# Patient Record
Sex: MALE | Race: WHITE | URBAN - NONMETROPOLITAN AREA
[De-identification: names, ages, dates, MRNs, and addresses within clinical notes are randomized per-mention and may not be internally consistent; named-entity substitution may affect disease eponyms.]

---

## 2017-02-03 ENCOUNTER — HISTORY (OUTPATIENT)
Dept: FAMILY MEDICINE | Facility: OTHER | Age: 52
End: 2017-02-03

## 2017-02-03 ENCOUNTER — OFFICE VISIT - GICH (OUTPATIENT)
Dept: FAMILY MEDICINE | Facility: OTHER | Age: 52
End: 2017-02-03

## 2017-02-03 DIAGNOSIS — M25.512 PAIN IN LEFT SHOULDER: ICD-10-CM

## 2017-02-03 DIAGNOSIS — G89.29 OTHER CHRONIC PAIN: ICD-10-CM

## 2017-03-16 ENCOUNTER — HISTORY (OUTPATIENT)
Dept: FAMILY MEDICINE | Facility: OTHER | Age: 52
End: 2017-03-16

## 2017-03-16 ENCOUNTER — OFFICE VISIT - GICH (OUTPATIENT)
Dept: FAMILY MEDICINE | Facility: OTHER | Age: 52
End: 2017-03-16

## 2017-03-16 DIAGNOSIS — M25.512 PAIN IN LEFT SHOULDER: ICD-10-CM

## 2017-03-16 DIAGNOSIS — G89.29 OTHER CHRONIC PAIN: ICD-10-CM

## 2017-03-30 ENCOUNTER — AMBULATORY - GICH (OUTPATIENT)
Dept: RADIOLOGY | Facility: OTHER | Age: 52
End: 2017-03-30

## 2017-03-30 DIAGNOSIS — M75.42 IMPINGEMENT SYNDROME OF LEFT SHOULDER: ICD-10-CM

## 2017-04-07 ENCOUNTER — HOSPITAL ENCOUNTER (OUTPATIENT)
Dept: RADIOLOGY | Facility: OTHER | Age: 52
End: 2017-04-07

## 2017-04-07 DIAGNOSIS — M75.42 IMPINGEMENT SYNDROME OF LEFT SHOULDER: ICD-10-CM

## 2017-04-17 ENCOUNTER — HISTORY (OUTPATIENT)
Dept: EMERGENCY MEDICINE | Facility: OTHER | Age: 52
End: 2017-04-17

## 2017-04-24 ENCOUNTER — OFFICE VISIT - GICH (OUTPATIENT)
Dept: INTERNAL MEDICINE | Facility: OTHER | Age: 52
End: 2017-04-24

## 2017-04-24 ENCOUNTER — AMBULATORY - GICH (OUTPATIENT)
Dept: SCHEDULING | Facility: OTHER | Age: 52
End: 2017-04-24

## 2017-04-24 ENCOUNTER — HISTORY (OUTPATIENT)
Dept: INTERNAL MEDICINE | Facility: OTHER | Age: 52
End: 2017-04-24

## 2017-04-24 DIAGNOSIS — J06.9 ACUTE UPPER RESPIRATORY INFECTION: ICD-10-CM

## 2017-04-24 DIAGNOSIS — B97.89 OTHER VIRAL AGENTS AS THE CAUSE OF DISEASES CLASSIFIED ELSEWHERE: ICD-10-CM

## 2017-04-24 DIAGNOSIS — R06.2 WHEEZING: ICD-10-CM

## 2017-04-24 DIAGNOSIS — Z87.891 PERSONAL HISTORY OF NICOTINE DEPENDENCE: ICD-10-CM

## 2017-04-24 DIAGNOSIS — R19.5 OTHER FECAL ABNORMALITIES: ICD-10-CM

## 2017-04-24 ASSESSMENT — PATIENT HEALTH QUESTIONNAIRE - PHQ9: SUM OF ALL RESPONSES TO PHQ QUESTIONS 1-9: 0

## 2017-04-26 ENCOUNTER — OFFICE VISIT - GICH (OUTPATIENT)
Dept: INTERNAL MEDICINE | Facility: OTHER | Age: 52
End: 2017-04-26

## 2017-04-26 ENCOUNTER — HOSPITAL ENCOUNTER (OUTPATIENT)
Dept: RADIOLOGY | Facility: OTHER | Age: 52
End: 2017-04-26
Attending: INTERNAL MEDICINE

## 2017-04-26 ENCOUNTER — HISTORY (OUTPATIENT)
Dept: INTERNAL MEDICINE | Facility: OTHER | Age: 52
End: 2017-04-26

## 2017-04-26 DIAGNOSIS — R05.9 COUGH: ICD-10-CM

## 2017-04-26 DIAGNOSIS — R06.02 SHORTNESS OF BREATH: ICD-10-CM

## 2017-04-26 DIAGNOSIS — R74.01 NONSPECIFIC ELEVATION OF LEVELS OF TRANSAMINASE AND LACTIC ACID DEHYDROGENASE (LDH): ICD-10-CM
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
## 2017-04-26 DIAGNOSIS — E87.6 HYPOKALEMIA: ICD-10-CM

## 2017-04-26 DIAGNOSIS — S30.860A INSECT BITE (NONVENOMOUS) OF LOWER BACK AND PELVIS, INITIAL ENCOUNTER: ICD-10-CM

## 2017-04-26 DIAGNOSIS — R74.02 NONSPECIFIC ELEVATION OF LEVELS OF TRANSAMINASE AND LACTIC ACID DEHYDROGENASE (LDH): ICD-10-CM

## 2017-04-26 LAB
A/G RATIO - HISTORICAL: 1.4 (ref 1–2)
ABSOLUTE BASOPHILS - HISTORICAL: 0 THOU/CU MM
ABSOLUTE EOSINOPHILS - HISTORICAL: 0 THOU/CU MM
ABSOLUTE LYMPHOCYTES - HISTORICAL: 2.3 THOU/CU MM (ref 0.9–2.9)
ABSOLUTE MONOCYTES - HISTORICAL: 0.4 THOU/CU MM
ABSOLUTE NEUTROPHILS - HISTORICAL: 2.3 THOU/CU MM (ref 1.7–7)
ALBUMIN SERPL-MCNC: 4.2 G/DL (ref 3.5–5.7)
ALP SERPL-CCNC: 110 IU/L (ref 34–104)
ALT (SGPT) - HISTORICAL: 64 IU/L (ref 7–52)
ANION GAP - HISTORICAL: 15 (ref 5–18)
AST SERPL-CCNC: 40 IU/L (ref 13–39)
BASOPHILS # BLD AUTO: 0.9 %
BILIRUB SERPL-MCNC: 0.7 MG/DL (ref 0.3–1)
BUN SERPL-MCNC: 23 MG/DL (ref 7–25)
BUN/CREAT RATIO - HISTORICAL: 21
CALCIUM SERPL-MCNC: 9.1 MG/DL (ref 8.6–10.3)
CHLORIDE SERPLBLD-SCNC: 99 MMOL/L (ref 98–107)
CO2 SERPL-SCNC: 22 MMOL/L (ref 21–31)
CREAT SERPL-MCNC: 1.12 MG/DL (ref 0.7–1.3)
EOSINOPHIL NFR BLD AUTO: 0.1 %
ERYTHROCYTE [DISTWIDTH] IN BLOOD BY AUTOMATED COUNT: 12.9 % (ref 11.5–15.5)
GFR IF NOT AFRICAN AMERICAN - HISTORICAL: >60 ML/MIN/1.73M2
GLOBULIN - HISTORICAL: 3.1 G/DL (ref 2–3.7)
GLUCOSE SERPL-MCNC: 107 MG/DL (ref 70–105)
HCT VFR BLD AUTO: 51.7 % (ref 37–53)
HEMOGLOBIN: 17 G/DL (ref 13.5–17.5)
LYMPHOCYTES NFR BLD AUTO: 45.2 % (ref 20–44)
MCH RBC QN AUTO: 28.2 PG (ref 26–34)
MCHC RBC AUTO-ENTMCNC: 32.8 G/DL (ref 32–36)
MCV RBC AUTO: 86 FL (ref 80–100)
MONOCYTES NFR BLD AUTO: 8.5 %
NEUTROPHILS NFR BLD AUTO: 45.3 % (ref 42–72)
PLATELET # BLD AUTO: 211 THOU/CU MM (ref 140–440)
PMV BLD: 10.4 FL (ref 6.5–11)
POTASSIUM SERPL-SCNC: 3 MMOL/L (ref 3.5–5.1)
PROT SERPL-MCNC: 7.3 G/DL (ref 6.4–8.9)
RED BLOOD COUNT - HISTORICAL: 6.01 MIL/CU MM (ref 4.3–5.9)
SODIUM SERPL-SCNC: 136 MMOL/L (ref 133–143)
WHITE BLOOD COUNT - HISTORICAL: 5.2 THOU/CU MM (ref 4.5–11)

## 2017-04-26 ASSESSMENT — PATIENT HEALTH QUESTIONNAIRE - PHQ9: SUM OF ALL RESPONSES TO PHQ QUESTIONS 1-9: 0

## 2017-04-27 LAB — LYME SCREEN W/REFLEX WEST BLOT - HISTORICAL: NEGATIVE

## 2017-05-25 ENCOUNTER — AMBULATORY - GICH (OUTPATIENT)
Dept: SCHEDULING | Facility: OTHER | Age: 52
End: 2017-05-25

## 2017-05-26 ENCOUNTER — COMMUNICATION - GICH (OUTPATIENT)
Dept: INTERNAL MEDICINE | Facility: OTHER | Age: 52
End: 2017-05-26

## 2017-05-31 ENCOUNTER — COMMUNICATION - GICH (OUTPATIENT)
Dept: FAMILY MEDICINE | Facility: OTHER | Age: 52
End: 2017-05-31

## 2017-05-31 ENCOUNTER — COMMUNICATION - GICH (OUTPATIENT)
Dept: INTERNAL MEDICINE | Facility: OTHER | Age: 52
End: 2017-05-31

## 2017-06-05 ENCOUNTER — AMBULATORY - GICH (OUTPATIENT)
Dept: FAMILY MEDICINE | Facility: OTHER | Age: 52
End: 2017-06-05

## 2017-06-06 ENCOUNTER — COMMUNICATION - GICH (OUTPATIENT)
Dept: INTERNAL MEDICINE | Facility: OTHER | Age: 52
End: 2017-06-06

## 2017-06-07 ENCOUNTER — AMBULATORY - GICH (OUTPATIENT)
Dept: SCHEDULING | Facility: OTHER | Age: 52
End: 2017-06-07

## 2017-06-14 ENCOUNTER — AMBULATORY - GICH (OUTPATIENT)
Dept: FAMILY MEDICINE | Facility: OTHER | Age: 52
End: 2017-06-14

## 2017-06-14 ENCOUNTER — HISTORY (OUTPATIENT)
Dept: FAMILY MEDICINE | Facility: OTHER | Age: 52
End: 2017-06-14

## 2017-06-14 DIAGNOSIS — I10 ESSENTIAL (PRIMARY) HYPERTENSION: ICD-10-CM

## 2017-06-14 DIAGNOSIS — G89.29 OTHER CHRONIC PAIN: ICD-10-CM

## 2017-06-14 DIAGNOSIS — M12.812 OTHER SPECIFIC ARTHROPATHIES, NOT ELSEWHERE CLASSIFIED, LEFT SHOULDER: ICD-10-CM

## 2017-06-14 DIAGNOSIS — E78.00 PURE HYPERCHOLESTEROLEMIA: ICD-10-CM

## 2017-06-14 DIAGNOSIS — M19.012 PRIMARY OSTEOARTHRITIS OF LEFT SHOULDER: ICD-10-CM

## 2017-06-14 DIAGNOSIS — Z01.818 ENCOUNTER FOR OTHER PREPROCEDURAL EXAMINATION: ICD-10-CM

## 2017-06-14 DIAGNOSIS — M25.512 PAIN IN LEFT SHOULDER: ICD-10-CM

## 2017-06-14 LAB
ANION GAP - HISTORICAL: 10 (ref 5–18)
BUN SERPL-MCNC: 22 MG/DL (ref 7–25)
BUN/CREAT RATIO - HISTORICAL: 19
CALCIUM SERPL-MCNC: 9.6 MG/DL (ref 8.6–10.3)
CHLORIDE SERPLBLD-SCNC: 104 MMOL/L (ref 98–107)
CO2 SERPL-SCNC: 24 MMOL/L (ref 21–31)
CREAT SERPL-MCNC: 1.18 MG/DL (ref 0.7–1.3)
GFR IF NOT AFRICAN AMERICAN - HISTORICAL: >60 ML/MIN/1.73M2
GLUCOSE SERPL-MCNC: 99 MG/DL (ref 70–105)
POTASSIUM SERPL-SCNC: 3.7 MMOL/L (ref 3.5–5.1)
SODIUM SERPL-SCNC: 138 MMOL/L (ref 133–143)

## 2017-06-14 ASSESSMENT — ANXIETY QUESTIONNAIRES
2. NOT BEING ABLE TO STOP OR CONTROL WORRYING: NOT AT ALL
GAD7 TOTAL SCORE: 0
7. FEELING AFRAID AS IF SOMETHING AWFUL MIGHT HAPPEN: NOT AT ALL
3. WORRYING TOO MUCH ABOUT DIFFERENT THINGS: NOT AT ALL
4. TROUBLE RELAXING: NOT AT ALL
6. BECOMING EASILY ANNOYED OR IRRITABLE: NOT AT ALL
5. BEING SO RESTLESS THAT IT IS HARD TO SIT STILL: NOT AT ALL
1. FEELING NERVOUS, ANXIOUS, OR ON EDGE: NOT AT ALL

## 2017-06-14 ASSESSMENT — PATIENT HEALTH QUESTIONNAIRE - PHQ9: SUM OF ALL RESPONSES TO PHQ QUESTIONS 1-9: 0

## 2017-08-14 ENCOUNTER — COMMUNICATION - GICH (OUTPATIENT)
Dept: FAMILY MEDICINE | Facility: OTHER | Age: 52
End: 2017-08-14

## 2017-08-14 DIAGNOSIS — I10 ESSENTIAL (PRIMARY) HYPERTENSION: ICD-10-CM

## 2017-12-20 ENCOUNTER — COMMUNICATION - GICH (OUTPATIENT)
Dept: FAMILY MEDICINE | Facility: OTHER | Age: 52
End: 2017-12-20

## 2017-12-20 DIAGNOSIS — B97.89 OTHER VIRAL AGENTS AS THE CAUSE OF DISEASES CLASSIFIED ELSEWHERE: ICD-10-CM

## 2017-12-20 DIAGNOSIS — J06.9 ACUTE UPPER RESPIRATORY INFECTION: ICD-10-CM

## 2017-12-28 NOTE — TELEPHONE ENCOUNTER
Patient Information     Patient Name MRN Sex Alcides Lomas [id number] Male 1965      Telephone Encounter by Charo Andrews at 2017  3:02 PM     Author:  Charo Andrews Service:  (none) Author Type:  (none)     Filed:  2017  3:07 PM Encounter Date:  2017 Status:  Signed     :  Charo Andrews            Spoke with patient regarding his leave of absence form recently faxed. It is still incorrect. He needs the following changes done to it. Line 1 first treatment date should be 17. Line 5 needs an end date of 17. The time away from work 1 week needs to be scratched off. Both lines 1 & 5 need to be initialed and dated. Charo Andrews LPN......................2017 3:06 PM

## 2017-12-28 NOTE — TELEPHONE ENCOUNTER
Patient Information     Patient Name MRN Alcides Dominguez [id number] Male 1965      Telephone Encounter by Marcelina Vasquez at 2017  4:20 PM     Author:  Marcelina Vasquez Service:  (none) Author Type:  (none)     Filed:  2017  4:22 PM Encounter Date:  2017 Status:  Signed     :  Marcelina Vasquez            Information corrected and re-faxed at this time.      Marcelina Vasquez LPN        2017 4:22 PM

## 2017-12-28 NOTE — TELEPHONE ENCOUNTER
Patient Information     Patient Name MRN Sex Alcides Lomas [id number] Male 1965      Telephone Encounter by Nickolas Mcneil MD at 2017  4:16 PM     Author:  Nickolas Mcneil MD Service:  (none) Author Type:  Physician     Filed:  2017  4:16 PM Encounter Date:  2017 Status:  Signed     :  Nickolas Mcneil MD (Physician)            Noted.   Nickolas Mcneil MD

## 2017-12-28 NOTE — TELEPHONE ENCOUNTER
Patient Information     Patient Name MRN Sex Alcides Lomas [id number] Male 1965      Telephone Encounter by Gypsy Smith RN at 2017  8:13 AM     Author:  Gypsy Smith RN Service:  (none) Author Type:  NURS- Registered Nurse     Filed:  2017  8:14 AM Encounter Date:  2017 Status:  Signed     :  Gypsy Smith RN (NURS- Registered Nurse)            Diuretic Combinations    Office visit in the past 12 months or per provider note.    Last visit with CONOR ODELL was on: 2017 in [facility]  Next visit with CONOR ODELL is on: No future appointment listed with this provider  Next visit with Family Practice is on: No future appointment listed in this department    Lab test requirements:  Creatinine and Potassium annually, if ordering lab, order BMP.  CREATININE (mg/dL)    Date Value   2017 1.18     POTASSIUM (mmol/L)    Date Value   2017 3.7       Max refill for 12 months from last office visit or per provider note.  Prescription refilled per RN Medication Refill Policy.................... GYPSY SMITH RN ....................  2017   8:13 AM
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
## 2017-12-30 NOTE — NURSING NOTE
Patient Information     Patient Name MRN Sex Alcides Lomas [id number] Male 1965      Nursing Note by Tanisha Montejo at 2017  1:30 PM     Author:  Tanisha Montejo Service:  (none) Author Type:  (none)     Filed:  2017  1:38 PM Encounter Date:  2017 Status:  Signed     :  Tanisha Montejo            Patient here for preop will be seeing  on 2017 at [place] for left shoulder scope and debridement. Tdap 2016. Tanisha Montejo LPN .......................2017  1:35 PM

## 2018-01-03 NOTE — NURSING NOTE
Patient Information     Patient Name MRN Alcides Dominguez [id number] Male 1965      Nursing Note by Tianna Jimenez at 3/16/2017  1:30 PM     Author:  Tianna Jimenez Service:  (none) Author Type:  (none)     Filed:  3/16/2017  1:47 PM Encounter Date:  3/16/2017 Status:  Signed     :  Tianna Jimenez            Patient presents to the clinic today for left should pain.    Tianna Jimenez ....................  3/16/2017   1:34 PM

## 2018-01-03 NOTE — PROGRESS NOTES
Patient Information     Patient Name MRN Sex Alcides Lomas [id number] Male 1965      Progress Notes by Asad Hermosillo MD at 3/16/2017  1:30 PM     Author:  Asad Hermosillo MD Service:  (none) Author Type:  Physician     Filed:  3/17/2017  9:23 AM Encounter Date:  3/16/2017 Status:  Signed     :  Asad Hermosillo MD (Physician)            SUBJECTIVE:    Alcides Ordonez is a 51 y.o. male who presents for left shoulder pain    HPI Comments: Patient arrives here for left shoulder pain. He's been doing Lodine plus exercises at home without any improvement. This is been going on for 2 months. Again he denies any obvious injury. Usually laying on it will make it worse.      Allergies     Allergen  Reactions     Codeine Anxiety     Lipitor [Atorvastatin] Myalgia     Pcn [Penicillins] Edema   ,   Family History      Problem  Relation Age of Onset     Heart Disease Paternal Grandfather    ,   Current Outpatient Prescriptions on File Prior to Visit       Medication  Sig Dispense Refill     budesonide-formoterol (SYMBICORT) 160-4.5 mcg/actuation (160-4.5 mcg each actuation) inhaler Inhale 2 Puffs by mouth 2 times daily. 1 Inhaler 0     etodolac (LODINE) 500 mg tablet Take 1 tablet by mouth 2 times daily with meals. 30 tablet 2     lisinopril-hydrochlorothiazide, 20-25 mg, (PRINZIDE, ZESTORETIC) 20-25 mg per tablet Take 1 tablet by mouth once daily. 90 tablet 3     multivitamin (MVI) tablet Take 1 tablet by mouth once daily.       omeprazole (PRILOSEC) 40 mg Delayed-Release capsule Take 1 capsule by mouth once daily. 90 capsule 3     simvastatin (ZOCOR) 40 mg tablet Take 1 tablet by mouth at bedtime. 90 tablet 2     triamcinolone (ARISTOCORT; KENALOG) 0.1 % cream Apply  topically to affected area(s) 3 times daily. 1 Tube 3     No current facility-administered medications on file prior to visit.    ,   Social History      Substance Use Topics        Smoking status:  Former Smoker     Quit date:  "7/16/2008     Smokeless tobacco:  Never Used     Alcohol use  No    and   Social History      Substance Use Topics        Smoking status:  Former Smoker     Quit date: 7/16/2008     Smokeless tobacco:  Never Used     Alcohol use  No       REVIEW OF SYSTEMS:  ROS    OBJECTIVE:  /80  Pulse 84  Ht 1.842 m (6' 0.5\")  Wt 107.5 kg (237 lb)  BMI 31.7 kg/m2    EXAM:   Physical Exam   Constitutional: He is well-developed, well-nourished, and in no distress.   Musculoskeletal: Normal range of motion. He exhibits tenderness (with abduction past 90.).   Neurological: He is alert.   Psychiatric: Affect normal.       ASSESSMENT/PLAN:    ICD-10-CM    1. Chronic left shoulder pain M25.512 triamcinolone acetonide 40 mg injection (KENALOG)     G89.29 NJ DRAIN/INJ INTERMEDIATE JOINT/BURSA WO US GUIDE        Plan:  Informed consent obtained. Patient's identity confirmed. Discussed complications to injection including infection. Patient wishes to pursue. Area was prepped with Hibiclens ×2. Using approach underneath the acromion 40 mg of Kenalog and half a cc of lidocaine was injected. Patient tolerated well. Patient will follow-up if he does not get good improvement. I did discuss other options including orthopedic referral but patient wishes to pursue injections first.          "

## 2018-01-03 NOTE — NURSING NOTE
Patient Information     Patient Name MRN Sex Alcides Lomas [id number] Male 1965      Nursing Note by Tanisha Montejo at 2/3/2017 10:00 AM     Author:  Tanisha Montejo Service:  (none) Author Type:  (none)     Filed:  2/3/2017 10:07 AM Encounter Date:  2/3/2017 Status:  Signed     :  Tanisha Montejo            Patient here for left shoulder pain for the past 2 months which is getting, becoming more frequent and more intense in pain.  Pain scale now is a 2/10, feels like a constant ache. Tanisha Montejo LPN .......................2/3/2017  10:03 AM

## 2018-01-03 NOTE — PATIENT INSTRUCTIONS
Patient Information     Patient Name MRN Sex Alcides Lomas [id number] Male 1965      Patient Instructions by Asad Hermosillo MD at 2/3/2017 10:13 AM     Author:  Asad Hermosillo MD Service:  (none) Author Type:  Physician     Filed:  2/3/2017 10:13 AM Encounter Date:  2/3/2017 Status:  Signed     :  Asad Hermosillo MD (Physician)            Index Czech     Adult Advisor 2016.2 published by [facility].  Last reviewed: 2014
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
## 2018-01-04 NOTE — NURSING NOTE
Patient Information     Patient Name MRN Sex Alcides Lomas [id number] Male 1965      Nursing Note by Marcelina Vasquez at 2017  8:10 AM     Author:  Marcelina Vasquez Service:  (none) Author Type:  (none)     Filed:  2017  8:11 AM Encounter Date:  2017 Status:  Signed     :  Marcelina Vasquez            Patient presents to the clinic for cough, decrease in appetite and fatigue noted over the past week.  Fever only occurred this past weekend.    Marcelina Vasquez LPN        2017 8:11 AM

## 2018-01-04 NOTE — NURSING NOTE
Patient Information     Patient Name MRN Sex Alcides Lomas [id number] Male 1965      Nursing Note by Marcelina Vasquez at 2017 12:30 PM     Author:  Marcelina Vasquez Service:  (none) Author Type:  (none)     Filed:  2017 12:30 PM Encounter Date:  2017 Status:  Signed     :  Marcelina Vasquez            Patient presents to the clinic for fatigue, non-productive cough and decrease in appetite noted over the past several days, diarrhea started this am.  Patient indicates 99.5 is the highest his temperature reading this past week.    Marcelina Vasquez LPN        2017 12:24 PM

## 2018-01-04 NOTE — PROGRESS NOTES
Patient Information     Patient Name MRN Sex Alcides Lomas [id number] Male 1965      Progress Notes by Erika Villa at 2017  2:59 PM     Author:  Erika Villa Service:  (none) Author Type:  (none)     Filed:  2017  2:59 PM Date of Service:  2017  2:59 PM Status:  Signed     :  Erika Villa            [place] Protocol    A. Pre-procedure verification complete yes  1-relevant information / documentation available, reviewed and properly matched to the patient; 2-consent accurate and complete, 3-equipment and supplies available    B. Site marking complete Yes  Site marked if not in continuous attendance with patient    C. TIME OUT completed yes  Time Out was conducted just prior to starting procedure to verify the eight required elements: 1-patient identity, 2-consent accurate and complete, 3-position, 4-correct side/site marked (if applicable), 5-procedure, 6-relevant images / results properly labeled and displayed (if applicable), 7-antibiotics / irrigation fluids (if applicable), 8-safety precautions.

## 2018-01-04 NOTE — PROGRESS NOTES
Patient Information     Patient Name MRN Sex Alcides Lomas [id number] Male 1965      Progress Notes by Erika Villa at 2017  2:59 PM     Author:  Erika Villa Service:  (none) Author Type:  (none)     Filed:  2017  2:59 PM Date of Service:  2017  2:59 PM Status:  Signed     :  Erika Villa            Falls Risk Criteria:    Age 65 and older or under age 4        Sensory deficits    Poor vision    Use of ambulatory aides    Impaired judgment    Unable to walk independently    Meets High Risk criteria for falls:  no

## 2018-01-04 NOTE — PROGRESS NOTES
Patient Information     Patient Name MRAlcides Hernandez [id number] Male 1965      Progress Notes by Nickolas Mcneil MD at 2017 12:30 PM     Author:  Nickolas Mcneil MD Service:  (none) Author Type:  Physician     Filed:  2017  2:25 PM Encounter Date:  2017 Status:  Signed     :  Nickolas Mcneil MD (Physician)            Nursing Notes:   Pedro, Marcelina HILL  2017 12:30 PM  Signed  Patient presents to the clinic for fatigue, non-productive cough and decrease in appetite noted over the past several days, diarrhea started this am.  Patient indicates 99.5 is the highest his temperature reading this past week.    Marcelina Vasquez LPN        2017 12:24 PM      Pedro Marcelina SERGIO  2017 12:40 PM  Signed  Duo Neb 1 vial ordered by Nickolas Mcneil MD.  Medication administered by Nickolas Mcneil MD.   Lot # [id number]  Exp. 2018  NDC 3698-7178-25  Patient tolerated well.  Marcelina Vasquez LPN..................2017  12:39 PM    Alcides Ordonez presents to clinic today for:   Chief Complaint    Patient presents with      General Illness/Other     HPI: Mr. Ordonez is a 51 y.o. male who presents today for evaluation of above.     (J06.9,  B97.89) Viral URI with cough  (primary encounter diagnosis)  (R19.5) Watery stools  (Z87.891) History of tobacco abuse  (R06.2) Wheezing     Patient states that beginning on Friday, just a few days ago, he was having a lot of coughing and he was quite weak over the weekend.  Was not really taking in a whole lot of food by mouth.  Since we didn't taste good.  This morning he started to have some watery diarrhea.  He had 4 episodes of watery stool so far today.  Has not had really any recent oral antibiotic therapy.  Rarely having any phlegm.  He's had some increased wheezing which is somewhat new for him.  Had a mild headache yesterday for a couple hours.  States that he slept most of the day yesterday.  Denied any significant myalgias or  body aches.  No high fevers.  No longer having headaches.    Patient has history of tobacco use he quit back 9 years ago.  Wheezing is new.    Advised to use over-the-counter Imodium if needed for loose stools.  No bloody or tarry stools.  No dysuria.  No bruising.  No vision or hearing changes.    Trial of DuoNeb nebulizer treatment in clinic today for the wheezing.    Mr. Ordonez's Body mass index is 31.24 kg/(m^2). This is out of the normal range for a 51 y.o. Normal range for ages 18-64 is between 18.5 and 24.9; normal range for ages 65+ is 23-30. To lose weight we reviewed risks and benefits of appropriate options such as diet, exercise, and medications. Patient's strategy will be  none; patient is not ready to act   BP Readings from Last 1 Encounters:04/24/17 : 108/76  Mr. Flannery blood pressure is within the normal range for adults. Per JNC-8 guidelines normal adult blood pressure is < 120/80, pre-hypertensive is between 120/80 and 139/89, and hypertension is 140/90 or greater.    PARVEZ:  No flowsheet data found.    PHQ9:  PHQ Depression Screening 3/16/2017 4/24/2017   Date of PHQ exam (doc flow) 3/16/2017 4/24/2017   1. Lack of interest/pleasure 0 - Not at all 0 - Not at all   2. Feeling down/depressed 0 - Not at all 0 - Not at all   PHQ-2 TOTAL SCORE 0 0   3. Trouble sleeping - 0 - Not at all   4. Decreased energy - 0 - Not at all   5. Appetite change - 0 - Not at all   6. Feelings of failure - 0 - Not at all   7. Trouble concentrating - 0 - Not at all   8. Activity level - 0 - Not at all   9. Hurting yourself - 0 - Not at all   PHQ-9 TOTAL SCORE - 0   PHQ-9 Severity Level - none   Functional Impairment - not difficult at all        I have personally reviewed the past medical history, past surgical history, medications, allergies, family and social history as listed below, on 4/24/2017.    Patient Active Problem List      Diagnosis Date Noted     Chronic left shoulder pain 02/03/2017      Hypertriglyceridemia 2016     Seborrheic dermatitis 2015     Decreased sex drive 2014     Hypertension 02/10/2014     Hyperlipidemia 02/10/2014     GERD (gastroesophageal reflux disease) 02/10/2014     No past medical history on file.  Past Surgical History:      Procedure  Laterality Date     COLONOSCOPY DIAGNOSTIC  14     F/U        KNEE ARTHROSCOPY      lt        OR REMOVAL TONSILS/ADENOIDS<12 YRS       SPL[facility]ECTOMY      from [facility]       Current Outpatient Prescriptions       Medication  Sig Dispense Refill     albuterol-ipratropium (DUONEB) (2.5-0.5 mg) in 3 mL NEBULIZATION solution Inhale 3 mL via a nebulizer one time for 1 dose. 3 mL 0     Dextromethorphan-guaFENesin (MUCINEX DM)  mg tablet Take 1 tablet by mouth 2 times daily if needed for Cough. - OTC / Generic Okay 60 tablet 1     etodolac (LODINE) 500 mg tablet Take 1 tablet by mouth 2 times daily with meals. 30 tablet 2     ipratropium-albuterol (COMBIVENT RESPIMAT) ( mcg each actuation) mist inhaler Inhale 1 Puff by mouth 4 times daily. - Scheduled Use 1 Inhaler 11     lisinopril-hydrochlorothiazide, 20-25 mg, (PRINZIDE, ZESTORETIC) 20-25 mg per tablet Take 1 tablet by mouth once daily. 90 tablet 3     methylPREDNISolone (MEDROL) 8 mg tablet Take 1 tablet by mouth once daily with a meal. - Take for 3-7 days for wheezing, restart if needed 14 tablet 0     multivitamin (MVI) tablet Take 1 tablet by mouth once daily.       omeprazole (PRILOSEC) 40 mg Delayed-Release capsule Take 1 capsule by mouth once daily. 90 capsule 3     simvastatin (ZOCOR) 40 mg tablet Take 1 tablet by mouth at bedtime. 90 tablet 2     Allergies     Allergen  Reactions     Codeine Anxiety     Lipitor [Atorvastatin] Myalgia     Pcn [Penicillins] Edema     Family History      Problem  Relation Age of Onset     Heart Disease Paternal Grandfather      Family Status     Relation  Status     Mother      Father Alive     Paternal Grandfather       Social History     Social History        Marital status:  Single     Spouse name: N/A     Number of children:  N/A     Years of education:  N/A     Social History Main Topics        Smoking status:  Former Smoker     Quit date: 7/16/2008     Smokeless tobacco:  Never Used     Alcohol use  No     Drug use:  No     Sexual activity:  Not on file     Other Topics  Concern     Not on file      Social History Narrative     Pertinent ROS was performed and was negative as noted in HPI above.     EXAM:   Vitals:     04/24/17 1224   BP: 108/76   Pulse: 84   Temp: 97  F (36.1  C)   TempSrc: Tympanic   SpO2: 96%   Weight: 104.5 kg (230 lb 6 oz)   Height: 1.829 m (6')     BP Readings from Last 3 Encounters:    04/24/17 108/76   04/17/17 135/89   03/16/17 114/80     Wt Readings from Last 3 Encounters:    04/24/17 104.5 kg (230 lb 6 oz)   04/17/17 104.3 kg (230 lb)   03/16/17 107.5 kg (237 lb)     Estimated body mass index is 31.24 kg/(m^2) as calculated from the following:    Height as of this encounter: 1.829 m (6').    Weight as of this encounter: 104.5 kg (230 lb 6 oz).     EXAM:  Constitutional: Pleasant, alert, appropriate appearance for age. No acute distress -- nonproductive cough.  ENT: Normocephalic, Atraumatic, Thyroid without nodules or tenderness   Left Ear: normal; external ear canal and TM clear, nontender.   Right Ear: normal; external ear canal and TM clear, nontender.   Nose/Mouth: Oral pharynx without erythema or exudates and Nose is patent bilaterally, no rhinorrhea   Eyes:  Extraocular muscles intact, Sclera non-icteric, Conjunctiva without erythema  Lymphatic Exam: Non-palpable nodes in neck, clavicular regions    Pulmonary: Nonproductive cough, mild scattered bilateral wheezing noted.  -- After DuoNeb nebulizer treatment performed, significant improvement in air movement, wheezing had resolved.    Cardiovascular Exam: regular rate and rhythm, no pedal edema  Gastrointestinal Exam: Obese, Soft,  non-tender, non-distended, positive bowel sounds  Integument: No abnormal rashes, sores, or ulcerations noted  Neurologic Exam: CN 2-12 grossly intact and patient able to mount exam table without difficulties   Musculoskeletal Exam: Moves upper and lower extremities symmetrically, No focal weakness  Gait and station appear grossly normal  Psychiatric Exam: Awake and Alert, Affect and mood appropriate  Speech is fluent, Thought process is normal    INVESTIGATIONS:  Results for orders placed or performed in visit on 11/14/16      RAPID STREP WITH REFLEX CULTURE      Result  Value Ref Range    STREP A ANTIGEN           Negative Negative       ASSESSMENT AND PLAN:  Alcides was seen today for general illness/other.    Diagnoses and all orders for this visit:    Viral URI with cough  -     albuterol-ipratropium (DUONEB) (2.5-0.5 mg) in 3 mL NEBULIZATION solution; Inhale 3 mL via a nebulizer one time for 1 dose.  -     CT INHALATION TREATMENT AIRWAY OBST  -     CT PULSE OXIMETRY SINGLE DETERMINATION  -     Dextromethorphan-guaFENesin (MUCINEX DM)  mg tablet; Take 1 tablet by mouth 2 times daily if needed for Cough. - OTC / Generic Okay    Watery stools    History of tobacco abuse  -     albuterol-ipratropium (DUONEB) (2.5-0.5 mg) in 3 mL NEBULIZATION solution; Inhale 3 mL via a nebulizer one time for 1 dose.  -     Dextromethorphan-guaFENesin (MUCINEX DM)  mg tablet; Take 1 tablet by mouth 2 times daily if needed for Cough. - OTC / Generic Okay  -     methylPREDNISolone (MEDROL) 8 mg tablet; Take 1 tablet by mouth once daily with a meal. - Take for 3-7 days for wheezing, restart if needed  -     ipratropium-albuterol (COMBIVENT RESPIMAT) ( mcg each actuation) mist inhaler; Inhale 1 Puff by mouth 4 times daily. - Scheduled Use    Wheezing  -     Dextromethorphan-guaFENesin (MUCINEX DM)  mg tablet; Take 1 tablet by mouth 2 times daily if needed for Cough. - OTC / Generic Okay  -     methylPREDNISolone  (MEDROL) 8 mg tablet; Take 1 tablet by mouth once daily with a meal. - Take for 3-7 days for wheezing, restart if needed  -     ipratropium-albuterol (COMBIVENT RESPIMAT) ( mcg each actuation) mist inhaler; Inhale 1 Puff by mouth 4 times daily. - Scheduled Use      lab results and schedule of future lab studies reviewed with patient, reviewed diet, exercise and weight control    -- Expected clinical course discussed   -- Medications and their side effects discussed    25 minutes spent in face-to-face interaction with patient (separate from separately billed procedures) with greater than 50% spent in counseling and care coordination of listed medical problems.      The 10-year ASCVD risk score ([place] FRAZIER Jr, et al., 2013) is: 5.2%    Values used to calculate the score:      Age: 51 years      Sex: Male      Is Non- : No      Diabetic: No      Tobacco smoker: No      Systolic Blood Pressure: 108 mmHg      Is BP treated: Yes      HDL Cholesterol: 26 mg/dL      Total Cholesterol: 193 mg/dL    Return if symptoms worsen or fail to improve.    Patient Instructions   DuoNeb nebulizer treatment with significant improvement in air movement and cough and wheezing in the clinic today.    Possible viral chest cold/bronchitis. -- History of tobacco use with wheezing noted.    Push oral hydration - prevent dehydration.  Urine should be clear or light yellow.    Start: Combivent Respimat inhaler.  1 puff up to 4 times daily for breathing and wheezing.    Consider -- Albuterol Inhaler - use as needed for wheezing and shortness of breath.   -- take 1 to 2 puff every 4 hours as needed.   -- Pre-treat prior to exposures or exercise that have been exacerbating your cough/breathing problems.   -- Try to use 15 to 30 minutes before exposures.    Mucinex DM - helps thin the phlegm and settle the cough, without causing drowsiness.   - Maximum strength (buy the generic)    Start Medrol, 8 mg prednisone tablet.   Take 1 pill by mouth daily with a meal.  Do this for anywhere from 3-7 days.  Repeat if needed for recurrent wheezing and breathing difficulties.    If significant changes occur where you start making lots of phlegm, has significant shortness of breath, fevers and chills and above treatments are not helping, recommended reevaluation.  May need chest x-ray possible antibiotics at that time.    Return as needed for follow-up with Dr. Mcneil.    Clinic : [phone]  Appointment line: [phone]      Nickolas Mcneil MD

## 2018-01-04 NOTE — PATIENT INSTRUCTIONS
Patient Information     Patient Name MRN Sex Alcides Lomas [id number] Male 1965      Patient Instructions by Nickolas Mcneil MD at 2017  8:10 AM     Author:  Nickolas Mcneil MD  Service:  (none) Author Type:  Physician     Filed:  2017  8:40 AM  Encounter Date:  2017 Status:  Addendum     :  Nickolas Mcneil MD (Physician)        Related Notes: Original Note by Nickolas Mcneil MD (Physician) filed at 2017  8:37 AM            Concerned about possible Lyme disease or Anaplasmosis.     Start doxycycline 100 mg tablet -- take twice daily (AM and PM) after meals.   Avoid dairy products within 1 to 2 hours (before or after) taking antibiotic -- as it will grab the antibiotic and not let it work.   -- you can eat whatever you want for lunch.       With cough, vomiting -- chest xray obtained.     Use tessalon pearles as directed for cough.       Push oral hydration - prevent dehydration.  Urine should be clear or light yellow.      2017 -- PROCEDURE:  XR CHEST 2 VIEWS PA AND LATERAL     HISTORY: Cough.     COMPARISON:  2016     FINDINGS:   The cardiac silhouette is normal in size. The pulmonary vasculature is normal.  The lungs are clear. No pleural effusion or pneumothorax.     IMPRESSION:  No acute cardiopulmonary disease.       Electronically Signed By: Tiana Quiñones M.D. on 2017 8:28 AM      - Rocephin shot in clinic today - antibiotic.     Consider Permethrin pants -- that helps with wood tick prevention.     Return as needed for follow-up with Dr. Mcneil.    Clinic : [phone]  Appointment line: [phone]

## 2018-01-04 NOTE — PATIENT INSTRUCTIONS
Patient Information     Patient Name MRN Sex Alcides Lomas [id number] Male 1965      Patient Instructions by Nickolas Mcneil MD at 2017 12:30 PM     Author:  Nickolas Mcneil MD  Service:  (none) Author Type:  Physician     Filed:  2017  1:01 PM  Encounter Date:  2017 Status:  Addendum     :  Nickolas Mcneil MD (Physician)        Related Notes: Original Note by Nickolas Mcneil MD (Physician) filed at 2017  1:00 PM            DuoNeb nebulizer treatment with significant improvement in air movement and cough and wheezing in the clinic today.    Possible viral chest cold/bronchitis. -- History of tobacco use with wheezing noted.    Push oral hydration - prevent dehydration.  Urine should be clear or light yellow.    Start: Combivent Respimat inhaler.  1 puff up to 4 times daily for breathing and wheezing.    Consider -- Albuterol Inhaler - use as needed for wheezing and shortness of breath.   -- take 1 to 2 puff every 4 hours as needed.   -- Pre-treat prior to exposures or exercise that have been exacerbating your cough/breathing problems.   -- Try to use 15 to 30 minutes before exposures.    Mucinex DM - helps thin the phlegm and settle the cough, without causing drowsiness.   - Maximum strength (buy the generic)    Start Medrol, 8 mg prednisone tablet.  Take 1 pill by mouth daily with a meal.  Do this for anywhere from 3-7 days.  Repeat if needed for recurrent wheezing and breathing difficulties.    If significant changes occur where you start making lots of phlegm, has significant shortness of breath, fevers and chills and above treatments are not helping, recommended reevaluation.  May need chest x-ray possible antibiotics at that time.    Return as needed for follow-up with Dr. Mcneil.    Clinic : [phone]  Appointment line: [phone]

## 2018-01-04 NOTE — NURSING NOTE
Patient Information     Patient Name MRN Sex Alcides Lomas [id number] Male 1965      Nursing Note by Marcelina Vasquez at 2017 12:30 PM     Author:  Marcelina Vasquez Service:  (none) Author Type:  (none)     Filed:  2017 12:40 PM Encounter Date:  2017 Status:  Signed     :  Marcelina Vasquez            [facility] 1 vial ordered by Nickolas Mcneil MD.  Medication administered by Nickolas Mcneil MD.   Lot # [id number]  Exp. 2018  NDC 0948-7488-25  Patient tolerated well.  Marcelina Vasquez LPN..................2017  12:39 PM
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
## 2018-01-05 NOTE — TELEPHONE ENCOUNTER
Patient Information     Patient Name MRN Alcides Dominguez [id number] Male 1965      Telephone Encounter by Yany Anna at 2017  3:04 PM     Author:  Yany Anna Service:  (none) Author Type:  (none)     Filed:  2017  3:07 PM Encounter Date:  2017 Status:  Signed     :  Yany Anna            Patient needed line 5 changed on [place] leave of absence form that was faxed. Line needs to say  to , not  to . Marcelina changed the dates as requested and refaxed form to [phone].  Yany FALLON, [facility].......2017..3:06 PM

## 2018-01-05 NOTE — TELEPHONE ENCOUNTER
Patient Information     Patient Name MRN Sex Alcides Lomas [id number] Male 1965      Telephone Encounter by Marcelina Vasquez at 2017  3:54 PM     Author:  Marcelina Vasquez Service:  (none) Author Type:  (none)     Filed:  2017  3:57 PM Encounter Date:  2017 Status:  Signed     :  Marcelina Vasquez            Form completed for [email or web address] and faxed back to [place].    Marcelina Vasquez LPN        2017 3:54 PM

## 2018-01-27 VITALS
WEIGHT: 230.38 LBS | SYSTOLIC BLOOD PRESSURE: 108 MMHG | HEART RATE: 84 BPM | OXYGEN SATURATION: 96 % | DIASTOLIC BLOOD PRESSURE: 76 MMHG | HEIGHT: 72 IN | TEMPERATURE: 97 F | BODY MASS INDEX: 31.2 KG/M2

## 2018-01-27 VITALS
SYSTOLIC BLOOD PRESSURE: 128 MMHG | HEART RATE: 76 BPM | WEIGHT: 238 LBS | WEIGHT: 233 LBS | DIASTOLIC BLOOD PRESSURE: 80 MMHG | BODY MASS INDEX: 32.27 KG/M2 | DIASTOLIC BLOOD PRESSURE: 80 MMHG | HEIGHT: 72 IN | SYSTOLIC BLOOD PRESSURE: 132 MMHG | HEART RATE: 64 BPM | BODY MASS INDEX: 31.56 KG/M2

## 2018-01-27 VITALS
WEIGHT: 231 LBS | HEIGHT: 72 IN | DIASTOLIC BLOOD PRESSURE: 74 MMHG | OXYGEN SATURATION: 95 % | BODY MASS INDEX: 31.29 KG/M2 | TEMPERATURE: 97.2 F | SYSTOLIC BLOOD PRESSURE: 122 MMHG | HEART RATE: 80 BPM

## 2018-01-27 VITALS
HEART RATE: 84 BPM | DIASTOLIC BLOOD PRESSURE: 80 MMHG | WEIGHT: 237 LBS | SYSTOLIC BLOOD PRESSURE: 114 MMHG | BODY MASS INDEX: 31.41 KG/M2 | HEIGHT: 73 IN

## 2018-02-02 ASSESSMENT — PATIENT HEALTH QUESTIONNAIRE - PHQ9
SUM OF ALL RESPONSES TO PHQ QUESTIONS 1-9: 0

## 2018-02-02 ASSESSMENT — ANXIETY QUESTIONNAIRES: GAD7 TOTAL SCORE: 0

## 2018-02-12 NOTE — TELEPHONE ENCOUNTER
Patient Information     Patient Name MRN Sex Alcides Lomas [id number] Male 1965      Telephone Encounter by Tiana Smith RN at 2017  3:08 PM     Author:  Tiana Smith RN Service:  (none) Author Type:  NURS- Registered Nurse     Filed:  2017  3:50 PM Encounter Date:  2017 Status:  Signed     :  Tiana Smith RN (NURS- Registered Nurse)            Medication is not on medication list, was reviewed last on 17 and discontinued at pre-op visit on 17. Contacted Patient and he states he is not in need of this medication and has been in [place] since August. Will refuse refill request at this time.     Unable to complete prescription refill per RN Medication Refill Policy.................... Tiana Smith RN ....................  2017   3:49 PM

## 2018-03-02 ENCOUNTER — DOCUMENTATION ONLY (OUTPATIENT)
Dept: FAMILY MEDICINE | Facility: OTHER | Age: 53
End: 2018-03-02

## 2018-03-02 PROBLEM — M25.512 CHRONIC LEFT SHOULDER PAIN: Status: ACTIVE | Noted: 2017-02-03

## 2018-03-02 PROBLEM — M12.812 LEFT ROTATOR CUFF TEAR ARTHROPATHY: Status: ACTIVE | Noted: 2017-06-14

## 2018-03-02 PROBLEM — E87.6 HYPOKALEMIA: Status: ACTIVE | Noted: 2017-04-26

## 2018-03-02 PROBLEM — M19.012 OSTEOARTHRITIS OF LEFT SHOULDER: Status: ACTIVE | Noted: 2017-06-14

## 2018-03-02 PROBLEM — M75.102 LEFT ROTATOR CUFF TEAR ARTHROPATHY: Status: ACTIVE | Noted: 2017-06-14

## 2018-03-02 PROBLEM — G89.29 CHRONIC LEFT SHOULDER PAIN: Status: ACTIVE | Noted: 2017-02-03

## 2018-03-02 PROBLEM — R74.01 TRANSAMINITIS: Status: ACTIVE | Noted: 2017-04-26

## 2018-03-02 RX ORDER — SIMVASTATIN 40 MG
40 TABLET ORAL
COMMUNITY
Start: 2017-06-14

## 2018-03-02 RX ORDER — DIPHENOXYLATE HYDROCHLORIDE AND ATROPINE SULFATE 2.5; .025 MG/1; MG/1
1 TABLET ORAL
COMMUNITY

## 2018-03-02 RX ORDER — LISINOPRIL AND HYDROCHLOROTHIAZIDE 20; 25 MG/1; MG/1
1 TABLET ORAL
COMMUNITY
Start: 2017-08-17

## 2018-03-02 RX ORDER — OMEPRAZOLE 40 MG/1
40 CAPSULE, DELAYED RELEASE ORAL
COMMUNITY
Start: 2016-09-02

## 2018-07-24 NOTE — PROGRESS NOTES
Patient Information     Patient Name  Alcides Ordonez MRN  [id number] Sex  Male   1965      Letter by Nickolas Mcneil MD at      Author:  Nickolas Mcneil MD Service:  (none) Author Type:  (none)    Filed:   Encounter Date:  2017 Status:  (Other)           [facility]done[facility]  [id number] [facility] [address]          2017    Dear Mr. Ordonez:    Following are the tests completed during your last clinic visit.  The results of these tests are included below.      + Liver enzymes came back elevated.  Will need close follow-up.  Consider hepatitis panel, liver ultrasound.    -- Liver enzyme elevation could be due to high cholesterol.    Potassium came back low, start oral potassium replacement.  Prescription sent to pharmacy.    Last Lipids:  Chol: 2016 193   414  HDL: 2016 26   LDL: 2014 127      Results for orders placed or performed in visit on 17      COMPLETE METABOLIC PANEL      Result  Value Ref Range    SODIUM 136 133 - 143 mmol/L    POTASSIUM 3.0 (L) 3.5 - 5.1 mmol/L    CHLORIDE 99 98 - 107 mmol/L    CO2,TOTAL 22 21 - 31 mmol/L    ANION GAP 15 5 - 18                    GLUCOSE 107 (H) 70 - 105 mg/dL    CALCIUM 9.1 8.6 - 10.3 mg/dL    BUN 23 7 - 25 mg/dL    CREATININE 1.12 0.70 - 1.30 mg/dL    BUN/CREAT RATIO           21                    GFR if African American >60 >60 ml/min/1.73m2    GFR if not African American >60 >60 ml/min/1.73m2    ALBUMIN 4.2 3.5 - 5.7 g/dL    PROTEIN,TOTAL 7.3 6.4 - 8.9 g/dL    GLOBULIN                  3.1 2.0 - 3.7 g/dL    A/G RATIO 1.4 1.0 - 2.0                    BILIRUBIN,TOTAL 0.7 0.3 - 1.0 mg/dL    ALK PHOSPHATASE 110 (H) 34 - 104 IU/L    ALT (SGPT) 64 (H) 7 - 52 IU/L    AST (SGOT) 40 (H) 13 - 39 IU/L   CBC WITH AUTO DIFFERENTIAL      Result  Value Ref Range    WHITE BLOOD COUNT         5.2 4.5 - 11.0 thou/cu mm    RED BLOOD COUNT           6.01 (H) 4.30 - 5.90 mil/cu mm    HEMOGLOBIN                17.0 13.5  - 17.5 g/dL    HEMATOCRIT                51.7 37.0 - 53.0 %    MCV                       86 80 - 100 fL    MCH                       28.2 26.0 - 34.0 pg    MCHC                      32.8 32.0 - 36.0 g/dL    RDW                       12.9 11.5 - 15.5 %    PLATELET COUNT            211 140 - 440 thou/cu mm    MPV                       10.4 6.5 - 11.0 fL    NEUTROPHILS               45.3 42.0 - 72.0 %    LYMPHOCYTES               45.2 (H) 20.0 - 44.0 %    MONOCYTES                 8.5 <12.0 %    EOSINOPHILS               0.1 <8.0 %    BASOPHILS                 0.9 <3.0 %    ABSOLUTE NEUTROPHILS      2.3 1.7 - 7.0 thou/cu mm    ABSOLUTE LYMPHOCYTES      2.3 0.9 - 2.9 thou/cu mm    ABSOLUTE MONOCYTES        0.4 <0.9 thou/cu mm    ABSOLUTE EOSINOPHILS      0.0 <0.5 thou/cu mm    ABSOLUTE BASOPHILS        0.0 <0.3 thou/cu mm         If you have any further questions or problems contact my office at  [phone]   --- or send [employer] message --- otherwise schedule an appointment.    Clinic : [phone]  Appointment line: [phone]     Thank you,    Nickolas Mcneil MD    Internal Medicine  [facility]     Reviewed and electronically signed by provider.